# Patient Record
Sex: MALE | URBAN - METROPOLITAN AREA
[De-identification: names, ages, dates, MRNs, and addresses within clinical notes are randomized per-mention and may not be internally consistent; named-entity substitution may affect disease eponyms.]

---

## 2021-10-13 ENCOUNTER — OFFICE VISIT (OUTPATIENT)
Dept: URBAN - METROPOLITAN AREA CLINIC 85 | Facility: CLINIC | Age: 71
End: 2021-10-13
Payer: COMMERCIAL

## 2021-10-13 DIAGNOSIS — H25.13 AGE-RELATED NUCLEAR CATARACT, BILATERAL: Primary | ICD-10-CM

## 2021-10-13 DIAGNOSIS — D23.112 OTHER BENIGN NEOPLASM OF SKIN OF RT LOWER EYELID, INCLUDING CANTHUS: ICD-10-CM

## 2021-10-13 PROCEDURE — 11440 EXC FACE-MM B9+MARG 0.5 CM/<: CPT | Performed by: OPHTHALMOLOGY

## 2021-10-13 PROCEDURE — 92002 INTRM OPH EXAM NEW PATIENT: CPT | Performed by: OPHTHALMOLOGY

## 2021-10-13 ASSESSMENT — INTRAOCULAR PRESSURE
OD: 17
OS: 17

## 2021-10-13 NOTE — IMPRESSION/PLAN
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 Plan: Discussed findings with patient. Educated patient on recommended surgical excision with  and treatment options. Pt elects surgical approach with excision today (OK per Dr. Martina Webster, as patient traveled from [facility]).

## 2021-10-13 NOTE — IMPRESSION/PLAN
Impression: Age-related nuclear cataract, bilateral: H25.13. Plan: Discussed findings. Discussed option of CE/IOL OU. Patient understands cataract effect on vision. Patient defers to have  CE w/IOL consult with  Dr. Cory Pascual at this time. Continue to monitor. RTC 1 year CEE or ASAP if decreased VA or pain.

## 2024-06-06 ENCOUNTER — OFFICE VISIT (OUTPATIENT)
Dept: URBAN - METROPOLITAN AREA CLINIC 85 | Facility: CLINIC | Age: 74
End: 2024-06-06
Payer: MEDICARE

## 2024-06-06 DIAGNOSIS — H43.813 VITREOUS DEGENERATION, BILATERAL: ICD-10-CM

## 2024-06-06 DIAGNOSIS — H04.123 FUCHS' DYSTROPHY: ICD-10-CM

## 2024-06-06 DIAGNOSIS — H25.13 AGE-RELATED NUCLEAR CATARACT, BILATERAL: Primary | ICD-10-CM

## 2024-06-06 PROCEDURE — 99204 OFFICE O/P NEW MOD 45 MIN: CPT | Performed by: OPTOMETRIST

## 2024-06-06 ASSESSMENT — KERATOMETRY
OS: 44.13
OD: 44.75

## 2024-06-06 ASSESSMENT — VISUAL ACUITY
OD: 20/30
OS: 20/30

## 2024-06-06 ASSESSMENT — INTRAOCULAR PRESSURE
OD: 16
OS: 17